# Patient Record
Sex: MALE | Race: WHITE | NOT HISPANIC OR LATINO | Employment: UNEMPLOYED | URBAN - METROPOLITAN AREA
[De-identification: names, ages, dates, MRNs, and addresses within clinical notes are randomized per-mention and may not be internally consistent; named-entity substitution may affect disease eponyms.]

---

## 2024-07-24 ENCOUNTER — OFFICE VISIT (OUTPATIENT)
Dept: ENDOCRINOLOGY | Facility: CLINIC | Age: 84
End: 2024-07-24
Payer: OTHER GOVERNMENT

## 2024-07-24 VITALS
DIASTOLIC BLOOD PRESSURE: 64 MMHG | SYSTOLIC BLOOD PRESSURE: 138 MMHG | WEIGHT: 162 LBS | HEIGHT: 74 IN | HEART RATE: 93 BPM | BODY MASS INDEX: 20.79 KG/M2

## 2024-07-24 DIAGNOSIS — E04.1 SOLITARY THYROID NODULE: ICD-10-CM

## 2024-07-24 DIAGNOSIS — E04.2 NONTOXIC MULTINODULAR GOITER: ICD-10-CM

## 2024-07-24 DIAGNOSIS — C73 THYROID CANCER: Primary | ICD-10-CM

## 2024-07-24 RX ORDER — DONEPEZIL HYDROCHLORIDE 10 MG/1
TABLET, FILM COATED ORAL
COMMUNITY

## 2024-07-24 RX ORDER — CETIRIZINE HYDROCHLORIDE 10 MG/1
TABLET ORAL
COMMUNITY

## 2024-07-24 RX ORDER — CLOPIDOGREL BISULFATE 75 MG/1
TABLET ORAL
COMMUNITY

## 2024-07-24 RX ORDER — ALFUZOSIN HYDROCHLORIDE 10 MG/1
TABLET, EXTENDED RELEASE ORAL
COMMUNITY

## 2024-07-24 RX ORDER — BUMETANIDE 1 MG/1
TABLET ORAL
COMMUNITY

## 2024-07-24 RX ORDER — FINASTERIDE 5 MG/1
TABLET, FILM COATED ORAL
COMMUNITY

## 2024-07-24 RX ORDER — DOXYCYCLINE 100 MG/1
TABLET ORAL
COMMUNITY
Start: 2024-06-14

## 2024-07-24 RX ORDER — HYDROCODONE BITARTRATE AND ACETAMINOPHEN 7.5; 325 MG/1; MG/1
1 TABLET ORAL EVERY 6 HOURS PRN
COMMUNITY
Start: 2024-04-15

## 2024-07-24 RX ORDER — ALBUTEROL SULFATE 90 UG/1
AEROSOL, METERED RESPIRATORY (INHALATION)
COMMUNITY
Start: 2024-02-10

## 2024-07-24 RX ORDER — ALBUTEROL SULFATE 2.5 MG/3ML
SOLUTION RESPIRATORY (INHALATION)
COMMUNITY

## 2024-07-24 RX ORDER — LANOLIN ALCOHOL/MO/W.PET/CERES
CREAM (GRAM) TOPICAL
COMMUNITY

## 2024-07-24 RX ORDER — HYDROCODONE BITARTRATE AND ACETAMINOPHEN 5; 325 MG/1; MG/1
TABLET ORAL
COMMUNITY

## 2024-07-24 RX ORDER — ACETAMINOPHEN 325 MG/1
TABLET ORAL
COMMUNITY

## 2024-07-24 RX ORDER — ATORVASTATIN CALCIUM 80 MG/1
TABLET, FILM COATED ORAL
COMMUNITY

## 2024-07-24 NOTE — PROGRESS NOTES
Goiter    Subjective   Theodora Romero is a 83 y.o. male. he is being seen for consultation today at the request of  Alessandra Raymond APRN for evaluation of   Thyroid nodules dx in 1/2024. Bilateral nodules are reported  in u/s: right 1.87 x 1.76 x 1.71 and left nodule 1.02 x 0.93 x 0.96 cm   Patient has multiple medical conditions, O2 dependent and with hx of CVA/heart condition. He is on eliquis and plavix.     He denies having any symptoms related to nodules: no swallowing problems. He has arthritis, back pain and not able to lay flat on the table for ultrasound exam.     Medications:    Current Outpatient Medications:     acetaminophen (TYLENOL) 325 MG tablet, 650 mg by oral route., Disp: , Rfl:     albuterol (PROVENTIL) (2.5 MG/3ML) 0.083% nebulizer solution, 2.5 mg by inhalation route., Disp: , Rfl:     albuterol sulfate  (90 Base) MCG/ACT inhaler, 2 inhalations by inhalation route., Disp: , Rfl:     alfuzosin (UROXATRAL) 10 MG 24 hr tablet, 10 mg by oral route., Disp: , Rfl:     apixaban (Eliquis) 5 MG tablet tablet, 5 mg by oral route., Disp: , Rfl:     atorvastatin (LIPITOR) 80 MG tablet, 80 mg by oral route., Disp: , Rfl:     bumetanide (BUMEX) 1 MG tablet, 1 mg by oral route., Disp: , Rfl:     cetirizine (zyrTEC) 10 MG tablet, 10 mg by oral route., Disp: , Rfl:     clopidogrel (PLAVIX) 75 MG tablet, 75 mg by oral route., Disp: , Rfl:     Diclofenac Sodium (VOLTAREN) 1 % gel gel, 2 apps by topical route., Disp: , Rfl:     donepezil (ARICEPT) 10 MG tablet, 10 mg by oral route., Disp: , Rfl:     doxycycline (ADOXA) 100 MG tablet, Take 1 tablet twice a day by oral route for 10 days., Disp: , Rfl:     finasteride (PROSCAR) 5 MG tablet, 5 mg by oral route., Disp: , Rfl:     HYDROcodone-acetaminophen (NORCO) 5-325 MG per tablet, 1 tablet by oral route., Disp: , Rfl:     HYDROcodone-acetaminophen (NORCO) 7.5-325 MG per tablet, Take 1 tablet by mouth Every 6 (Six) Hours As Needed., Disp: , Rfl:      "melatonin 3 MG tablet, 6 mg by oral route., Disp: , Rfl:       Review of Systems   Constitutional:  Positive for fatigue.   HENT:  Negative for trouble swallowing.    Musculoskeletal:  Positive for gait problem.   Neurological:  Positive for dizziness and weakness.   Hematological:  Bruises/bleeds easily.       Objective   Vitals:    07/24/24 1438   BP: 138/64   Pulse: 93   Weight: 73.5 kg (162 lb)   Height: 188 cm (74\")    Body mass index is 20.8 kg/m².   Physical Exam      No results found for this or any previous visit.          Thyroid ultrasound 07/24/24      Real time high resolution imaging of the thyroid gland was performed in transverse and longitudinal planes.   Previous images were reviewed and compared to the current appearance to assess stability.       The right lobe measured 3.95 cm L x 2.49 cm AP x 2.32 cm in TV dimension.    The isthmus measured 0.31 cm in thickness.    The left thyroid lobe measured 3.81 cm L x 2.05  cm AP x 1.97 cm in TV dimension.    Thyroid gland is prominent and contains multiple nodules.     Nodule 1   is located in the right lower lobe and measures 1.73  x 1.28 x 1.26 cm (L X AP X TV).  This nodule is solid, homogeneous, hypoechoic with well-defined margins, and Grade II vascularity on Color Flow Doppler.  It has no artifacts    Nodule 2 is located on the left lobe and measures approximately 1 cm.       No pathologic lymph nodes were seen.      Assessment: Bilateral thyroid nodules appear stable. Considering his complex medical history FNA is not indicated as patient is not a candidate for surgery unless there is aggressive growth of the nodule or compression symptoms. The nodule is contained to the thyroid, appear slightly smaller compared to u/s 6 months ago.       Assessment/Plan:    Problem List Items Addressed This Visit    None  Visit Diagnoses       Thyroid cancer    -  Primary    Solitary thyroid nodule        Nontoxic multinodular goiter        Relevant Orders    US " Thyroid (Completed)            Old records were reviewed and summarized in HPI section of the note.  Previous ultrasound report was reviewed and compared to current finding.   The nodules are stable and FNA is not indicated.   My recommendations are to repeat ultrasound in about 1 year (could be done in local radiology department). Conservative management is recommended unless he experiences compression symptoms or the nodules increased in size.     No follow-ups on file.